# Patient Record
Sex: FEMALE | Race: WHITE | Employment: OTHER | URBAN - METROPOLITAN AREA
[De-identification: names, ages, dates, MRNs, and addresses within clinical notes are randomized per-mention and may not be internally consistent; named-entity substitution may affect disease eponyms.]

---

## 2022-01-15 ENCOUNTER — HOSPITAL ENCOUNTER (EMERGENCY)
Age: 67
Discharge: HOME | End: 2022-01-15
Attending: EMERGENCY MEDICINE
Payer: MEDICARE

## 2022-01-15 VITALS
TEMPERATURE: 99 F | HEIGHT: 64 IN | HEART RATE: 121 BPM | OXYGEN SATURATION: 98 % | WEIGHT: 120.4 LBS | BODY MASS INDEX: 20.55 KG/M2 | DIASTOLIC BLOOD PRESSURE: 112 MMHG | SYSTOLIC BLOOD PRESSURE: 180 MMHG | RESPIRATION RATE: 22 BRPM

## 2022-01-15 DIAGNOSIS — R03.0 ELEVATED BLOOD PRESSURE READING: ICD-10-CM
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
## 2022-01-15 PROCEDURE — 96372 THER/PROPH/DIAG INJ SC/IM: CPT

## 2022-01-15 PROCEDURE — 6360000002 HC RX W HCPCS: Performed by: EMERGENCY MEDICINE

## 2022-01-15 PROCEDURE — 99284 EMERGENCY DEPT VISIT MOD MDM: CPT

## 2022-01-15 PROCEDURE — 90715 TDAP VACCINE 7 YRS/> IM: CPT | Performed by: EMERGENCY MEDICINE

## 2022-01-15 PROCEDURE — 6370000000 HC RX 637 (ALT 250 FOR IP): Performed by: EMERGENCY MEDICINE

## 2022-01-15 PROCEDURE — 90471 IMMUNIZATION ADMIN: CPT | Performed by: EMERGENCY MEDICINE

## 2022-01-15 RX ORDER — BACITRACIN ZINC AND POLYMYXIN B SULFATE 500; 1000 [USP'U]/G; [USP'U]/G
OINTMENT TOPICAL ONCE
Status: COMPLETED | OUTPATIENT
Start: 2022-01-15 | End: 2022-01-15

## 2022-01-15 RX ORDER — ACETAMINOPHEN 500 MG
1000 TABLET ORAL ONCE
Status: COMPLETED | OUTPATIENT
Start: 2022-01-15 | End: 2022-01-15

## 2022-01-15 RX ORDER — AMOXICILLIN AND CLAVULANATE POTASSIUM 875; 125 MG/1; MG/1
1 TABLET, FILM COATED ORAL EVERY 12 HOURS SCHEDULED
Status: DISCONTINUED | OUTPATIENT
Start: 2022-01-15 | End: 2022-01-15 | Stop reason: HOSPADM

## 2022-01-15 RX ORDER — AMOXICILLIN AND CLAVULANATE POTASSIUM 875; 125 MG/1; MG/1
1 TABLET, FILM COATED ORAL 2 TIMES DAILY
Qty: 20 TABLET | Refills: 0 | Status: SHIPPED | OUTPATIENT
Start: 2022-01-15 | End: 2022-01-25

## 2022-01-15 RX ADMIN — AMOXICILLIN AND CLAVULANATE POTASSIUM 1 TABLET: 875; 125 TABLET, FILM COATED ORAL at 09:31

## 2022-01-15 RX ADMIN — ACETAMINOPHEN 1000 MG: 500 TABLET ORAL at 09:31

## 2022-01-15 RX ADMIN — TETANUS TOXOID, REDUCED DIPHTHERIA TOXOID AND ACELLULAR PERTUSSIS VACCINE, ADSORBED 0.5 ML: 5; 2.5; 8; 8; 2.5 SUSPENSION INTRAMUSCULAR at 09:31

## 2022-01-15 RX ADMIN — BACITRACIN ZINC AND POLYMYXIN B SULFATE: 500; 10000 OINTMENT TOPICAL at 09:34

## 2022-01-15 ASSESSMENT — PAIN SCALES - GENERAL: PAINLEVEL_OUTOF10: 5

## 2022-01-15 ASSESSMENT — PAIN DESCRIPTION - ORIENTATION: ORIENTATION: RIGHT;LEFT

## 2022-01-15 ASSESSMENT — PAIN DESCRIPTION - DESCRIPTORS: DESCRIPTORS: SORE

## 2022-01-15 ASSESSMENT — PAIN DESCRIPTION - PAIN TYPE: TYPE: ACUTE PAIN

## 2022-01-15 NOTE — ED PROVIDER NOTES
TRIAGE CHIEF COMPLAINT:   Chief Complaint   Patient presents with    Animal Bite     pt's cat attacked her, cat's shots up to date          HPI: Yuki Spear is a 77 y.o. female who presents to the Emergency Department with complaint of multiple cat bites and scratches. Patient states around 7:30 AM this morning she played a video of a cat and the suddenly her healthy immunized cat jumped on her and bit her in the hands as well as scratched her on the nose and right side of her face and both legs. She states the cat has been healthy recently. Complains of some mild pain. No fever or chills. Her tetanus is not up-to-date. The patient states that she has a history of marked blood pressure elevation when she comes to the doctor and so her specialist do not typically check her blood pressure. She does not have a primary care doctor. REVIEW OF SYSTEMS:  6 systems reviewed. Pertinent positives per HPI. Otherwise noted to be negative. Nursing notes reviewed and agree with above. Past medical/surgical history reviewed. MEDICATIONS   Patient's Medications    No medications on file         ALLERGIES No Known Allergies      BP (!) 187/123   Pulse 121   Temp 99 °F (37.2 °C) (Oral)   Resp 22   Ht 5' 3.5\" (1.613 m)   Wt 120 lb 6.4 oz (54.6 kg)   SpO2 98%   BMI 20.99 kg/m²   General:  No acute distress. Non toxic appearance  Head:   Normocephalic and atraumatic  Eyes:   Conjunctiva clear, ANJEL, EOM's intact. Sclera anicteric. ENT:   Mucous membranes moist  Neck:   Supple. No adenopathy. Lungs/Chest:  No respiratory distress  CVS:   Regular rate and rhythm  Extremities:  Full range of motion  Skin:   Multiple scratches noted on the bilateral hands with deeper 1 cm superficial lacerations on both hands as well. She has superficial scratch of the nose and several scratches of the right eyebrow area. Multiple scratches noted on both lower legs left greater than right.   There is no evidence of cellulitis. Neuro:  Alert and OX3. Speech clear and appropriate. No extremity weakness. Normal sensation in all extremities. No facial asymmetry. Gait normal.  Psych:   Affect normal. Mood normal        RADIOLOGY:      LAB      PROCEDURES:         ED COURSE / MDM:  22-year-old female with multiple cat bites and scratches from her own healthy immunized cat that occurred this morning at 7:30 AM when her healthy immunized cat attacked her after she played a video of a cat. Tetanus was updated. She was started on Augmentin here and given Tylenol. All the abrasions and lacerations were cleaned and covered with Polysporin ointment. Dressings were placed over the deeper areas on her hands. Patient's recheck blood pressure was 180/110. She declines a prescription at this time for hypertension stating she will follow-up with primary care. I advised her to check her blood pressure 3 times a week and record this for her new primary care doctor. She was given a prescription for Augmentin. Recommended Tylenol/ibuprofen if needed for pain. Recommended twice a day cleaning of the abrasions/lacerations with soap and water then apply over-the-counter Polysporin ointment and cover the deeper areas on the hands until healed. The patient has an appointment today to take the cat to the vet to be sure the cat is healthy. I discussed with Mayrchuy Cronin the results of evaluation in the Emergency Department, diagnosis, care and prognosis. The plan is to discharge to home. The patient is in agreement with the plan and questions have been answered. I also discussed with the patient and/or family the reasons which may require a return visit and the importance of follow-up care.        (Please note that portions of this note may have been completed with a voice recognition program.  Efforts were made to edit the dictation but occasionally words are mis-transcribed)        FINAL IMPRESSION:  1 --cat bites bilateral hands  2 --cat scratches hands, legs and face  3 --elevated blood pressure reading            Deep Long MD  01/15/22 [id number]